# Patient Record
Sex: FEMALE | Race: WHITE | NOT HISPANIC OR LATINO | URBAN - METROPOLITAN AREA
[De-identification: names, ages, dates, MRNs, and addresses within clinical notes are randomized per-mention and may not be internally consistent; named-entity substitution may affect disease eponyms.]

---

## 2018-10-10 ENCOUNTER — IMPORTED ENCOUNTER (OUTPATIENT)
Dept: URBAN - METROPOLITAN AREA CLINIC 31 | Facility: CLINIC | Age: 58
End: 2018-10-10

## 2018-10-10 PROBLEM — H40.013: Noted: 2018-10-10

## 2018-10-10 PROCEDURE — 99203 OFFICE O/P NEW LOW 30 MIN: CPT

## 2018-10-10 PROCEDURE — 92133 CPTRZD OPH DX IMG PST SGM ON: CPT

## 2018-10-10 PROCEDURE — 92083 EXTENDED VISUAL FIELD XM: CPT

## 2018-10-10 NOTE — PATIENT DISCUSSION
1.  Glaucoma suspect OU - Elevated IOPs family hx large asymmetric cups and mild superior [employer] depression superior OD. VF normal OU. Start Lumigan 1 gt QHS. 2 W TA check. 2. Return for an appointment in 2 weeks for pressure check gonio and pach with Dr. Flor Cooper.

## 2018-11-07 ENCOUNTER — IMPORTED ENCOUNTER (OUTPATIENT)
Dept: URBAN - METROPOLITAN AREA CLINIC 31 | Facility: CLINIC | Age: 58
End: 2018-11-07

## 2018-11-07 PROBLEM — H40.013: Noted: 2018-11-07

## 2018-11-07 PROCEDURE — 99213 OFFICE O/P EST LOW 20 MIN: CPT

## 2019-03-07 ENCOUNTER — IMPORTED ENCOUNTER (OUTPATIENT)
Dept: URBAN - METROPOLITAN AREA CLINIC 31 | Facility: CLINIC | Age: 59
End: 2019-03-07

## 2019-03-07 PROBLEM — H40.013: Noted: 2019-03-07

## 2019-03-07 PROCEDURE — 76514 ECHO EXAM OF EYE THICKNESS: CPT

## 2019-03-07 PROCEDURE — 99213 OFFICE O/P EST LOW 20 MIN: CPT

## 2019-03-07 NOTE — PATIENT DISCUSSION
1.  Glaucoma suspect OU - Poor compliance with elevated IOPs again. Stressed importance of compliance. Resume latanoprost HS. Strong family hx large asymmetric cups and mild superior [employer] depression superior OD. VF normal OU.  2.  Return for an appointment in 6 weeks for pressure check. with Dr. Thomas Parada.

## 2019-04-23 ENCOUNTER — IMPORTED ENCOUNTER (OUTPATIENT)
Dept: URBAN - METROPOLITAN AREA CLINIC 31 | Facility: CLINIC | Age: 59
End: 2019-04-23

## 2019-04-23 PROBLEM — H40.013: Noted: 2019-04-23

## 2019-04-23 PROCEDURE — 99213 OFFICE O/P EST LOW 20 MIN: CPT

## 2019-04-23 NOTE — PATIENT DISCUSSION
1.  Glaucoma suspect OU - Good IOPs. Continue latanoprost HS. Strong family hx large asymmetric cups and mild superior [employer] depression superior OD. VF normal OU.  2.  Return for an appointment in 6 months for dilated fundus exam and OCT Nerve. with Dr. Janelle Wiley.

## 2019-10-24 ENCOUNTER — IMPORTED ENCOUNTER (OUTPATIENT)
Dept: URBAN - METROPOLITAN AREA CLINIC 31 | Facility: CLINIC | Age: 59
End: 2019-10-24

## 2019-11-15 ENCOUNTER — IMPORTED ENCOUNTER (OUTPATIENT)
Dept: URBAN - METROPOLITAN AREA CLINIC 31 | Facility: CLINIC | Age: 59
End: 2019-11-15

## 2019-11-15 PROBLEM — H40.013: Noted: 2019-11-15

## 2019-11-15 PROCEDURE — 99214 OFFICE O/P EST MOD 30 MIN: CPT

## 2019-11-15 PROCEDURE — 92133 CPTRZD OPH DX IMG PST SGM ON: CPT

## 2019-11-15 NOTE — PATIENT DISCUSSION
1.  Glaucoma suspect OU - Good IOPs with good compliance. Continue latanoprost HS. Strong family hx large asymmetric cups and mild superior [employer] depression superior OD but stable. Normal OCT OS. VF normal OU.  2.  Return for an appointment in 6 months for complete exam and 24-2VF with Dr. Henrry Powers.

## 2021-01-26 ENCOUNTER — IMPORTED ENCOUNTER (OUTPATIENT)
Dept: URBAN - METROPOLITAN AREA CLINIC 31 | Facility: CLINIC | Age: 61
End: 2021-01-26

## 2021-01-26 PROBLEM — H40.013: Noted: 2021-01-26

## 2021-01-26 PROCEDURE — 92015 DETERMINE REFRACTIVE STATE: CPT

## 2021-01-26 PROCEDURE — 92014 COMPRE OPH EXAM EST PT 1/>: CPT

## 2021-01-26 PROCEDURE — 92083 EXTENDED VISUAL FIELD XM: CPT

## 2021-01-26 NOTE — PATIENT DISCUSSION
1.  Glaucoma suspect OU - Good IOPs with good compliance. Continue latanoprost HS. Strong family hx large asymmetric cups and mild superior [employer] depression superior OD but stable. VF normal OU.  2.  Return for an appointment in 6 months for pressure check with Dr. Ritu Salas.

## 2021-09-10 ENCOUNTER — IMPORTED ENCOUNTER (OUTPATIENT)
Dept: URBAN - METROPOLITAN AREA CLINIC 31 | Facility: CLINIC | Age: 61
End: 2021-09-10

## 2021-09-10 PROBLEM — H40.013: Noted: 2021-09-10

## 2021-09-10 PROCEDURE — 99213 OFFICE O/P EST LOW 20 MIN: CPT

## 2021-09-10 NOTE — PATIENT DISCUSSION
1.  Glaucoma suspect OU - Good IOPs with good compliance. Continue latanoprost HS. Strong family hx large asymmetric cups and mild superior [employer] depression superior OD but stable. VF normal OU.  2.  Return for an appointment in 6 months for complete exam and optic nerve OCT with Dr. Ramos Feldman.

## 2022-03-24 ENCOUNTER — IMPORTED ENCOUNTER (OUTPATIENT)
Dept: URBAN - METROPOLITAN AREA CLINIC 31 | Facility: CLINIC | Age: 62
End: 2022-03-24

## 2022-03-24 PROBLEM — H40.013: Noted: 2022-03-24

## 2022-03-24 PROCEDURE — 92015 DETERMINE REFRACTIVE STATE: CPT

## 2022-03-24 PROCEDURE — 92133 CPTRZD OPH DX IMG PST SGM ON: CPT

## 2022-03-24 PROCEDURE — 99214 OFFICE O/P EST MOD 30 MIN: CPT

## 2022-03-24 NOTE — PATIENT DISCUSSION
1.  Glaucoma suspect OU -  Stop Latanoprost because of redness. Start Timolol 1 gtt QAM OU2. Return for an appointment in 3 weeks for pressure check with Dr. Hermila Vizcaino.

## 2022-04-02 ASSESSMENT — TONOMETRY
OS_IOP_MMHG: 19
OS_IOP_MMHG: 17
OD_IOP_MMHG: 20
OD_IOP_MMHG: 16
OS_IOP_MMHG: 28
OS_IOP_MMHG: 14
OS_IOP_MMHG: 19
OD_IOP_MMHG: 14
OS_IOP_MMHG: 13
OD_IOP_MMHG: 17
OD_IOP_MMHG: 26
OD_IOP_MMHG: 12
OD_IOP_MMHG: 26
OS_IOP_MMHG: 12
OS_IOP_MMHG: 26
OD_IOP_MMHG: 19

## 2022-04-02 ASSESSMENT — VISUAL ACUITY
OS_CC: 20/40+1
OS_SC: 20/20-2
OD_CC: 20/25
OS_CC: 20/30
OD_CC: 20/20-2
OS_CC: 20/30-2
OD_CC: 20/25-2
OS_PH: SC 20/20 -2
OD_CC: 20/25-3
OS_CC: 20/60+1
OS_CC: 20/40
OU_SC: 20/25-2
OD_CC: 20/20
OD_CC: 20/20-3
OS_CC: 20/40+2
OS_CC: 20/40
OS_SC: 20/25+1
OS_CC: 20/30+2
OD_CC: 20/30
OS_PH: SC 20/25 -1
OS_PH: SC 20/25
OD_SC: 20/25-1
OD_SC: 20/15-1
OD_CC: 20/30

## 2022-04-02 ASSESSMENT — PACHYMETRY
OS_CT_UM: 609
OD_CT_UM: 616

## 2022-04-20 ENCOUNTER — PREPPED CHART (OUTPATIENT)
Dept: URBAN - METROPOLITAN AREA CLINIC 29 | Facility: CLINIC | Age: 62
End: 2022-04-20

## 2022-04-20 NOTE — PATIENT DISCUSSION
1.  Glaucoma suspect OU -  Stop Latanoprost because of redness. Start Timolol 1 gtt QAM OU2. Return for an appointment in 3 weeks for pressure check with Dr. Henrry Powers.

## 2022-04-21 ENCOUNTER — FOLLOW UP (OUTPATIENT)
Dept: URBAN - METROPOLITAN AREA CLINIC 29 | Facility: CLINIC | Age: 62
End: 2022-04-21

## 2022-04-21 DIAGNOSIS — H40.013: ICD-10-CM

## 2022-04-21 PROCEDURE — 99213 OFFICE O/P EST LOW 20 MIN: CPT

## 2022-04-21 ASSESSMENT — TONOMETRY
OS_IOP_MMHG: 16
OD_IOP_MMHG: 16

## 2022-04-21 ASSESSMENT — VISUAL ACUITY
OS_CC: 20/25
OD_CC: 20/20

## 2022-04-21 NOTE — PATIENT DISCUSSION
"Pressure at goal. Med changed from latanoprost to timolol because of ""redness"". IOPs at goal. T max was 26 mmHg OU. "

## 2023-05-03 ENCOUNTER — COMPREHENSIVE EXAM (OUTPATIENT)
Dept: URBAN - METROPOLITAN AREA CLINIC 29 | Facility: CLINIC | Age: 63
End: 2023-05-03

## 2023-05-03 DIAGNOSIS — H52.4: ICD-10-CM

## 2023-05-03 DIAGNOSIS — H52.12: ICD-10-CM

## 2023-05-03 DIAGNOSIS — H52.01: ICD-10-CM

## 2023-05-03 DIAGNOSIS — H52.203: ICD-10-CM

## 2023-05-03 DIAGNOSIS — H40.013: ICD-10-CM

## 2023-05-03 PROCEDURE — 92133 CPTRZD OPH DX IMG PST SGM ON: CPT

## 2023-05-03 PROCEDURE — 92015 DETERMINE REFRACTIVE STATE: CPT

## 2023-05-03 PROCEDURE — 99214 OFFICE O/P EST MOD 30 MIN: CPT

## 2023-05-03 ASSESSMENT — VISUAL ACUITY
OS_SC: 20/40
OD_SC: 20/30
OS_PH: 20/25

## 2023-05-03 ASSESSMENT — TONOMETRY
OD_IOP_MMHG: 14
OS_IOP_MMHG: 15

## 2023-11-06 ENCOUNTER — FOLLOW UP (OUTPATIENT)
Dept: URBAN - METROPOLITAN AREA CLINIC 29 | Facility: CLINIC | Age: 63
End: 2023-11-06

## 2023-11-06 DIAGNOSIS — H40.013: ICD-10-CM

## 2023-11-06 PROCEDURE — 99213 OFFICE O/P EST LOW 20 MIN: CPT

## 2023-11-06 ASSESSMENT — TONOMETRY
OS_IOP_MMHG: 22
OD_IOP_MMHG: 19

## 2023-11-06 ASSESSMENT — VISUAL ACUITY
OD_SC: 20/25
OS_SC: 20/30-2

## 2024-04-29 NOTE — PATIENT DISCUSSION
Return for an appointment in 2 weeks for pressure check. with Dr. Megan Morgan. Patient requests all Lab, Cardiology, and Radiology Results on their Discharge Instructions

## 2025-01-29 ENCOUNTER — COMPREHENSIVE EXAM (OUTPATIENT)
Age: 65
End: 2025-01-29

## 2025-01-29 DIAGNOSIS — H52.223: ICD-10-CM

## 2025-01-29 DIAGNOSIS — H52.4: ICD-10-CM

## 2025-01-29 DIAGNOSIS — H52.12: ICD-10-CM

## 2025-01-29 DIAGNOSIS — H52.01: ICD-10-CM

## 2025-01-29 DIAGNOSIS — H40.013: ICD-10-CM

## 2025-01-29 PROCEDURE — 92133 CPTRZD OPH DX IMG PST SGM ON: CPT

## 2025-01-29 PROCEDURE — 99214 OFFICE O/P EST MOD 30 MIN: CPT

## 2025-01-29 PROCEDURE — 92015 DETERMINE REFRACTIVE STATE: CPT
